# Patient Record
Sex: FEMALE | Race: WHITE
[De-identification: names, ages, dates, MRNs, and addresses within clinical notes are randomized per-mention and may not be internally consistent; named-entity substitution may affect disease eponyms.]

---

## 2018-02-27 ENCOUNTER — HOSPITAL ENCOUNTER (INPATIENT)
Dept: HOSPITAL 74 - JLDR | Age: 33
LOS: 4 days | Discharge: HOME | End: 2018-03-03
Attending: OBSTETRICS & GYNECOLOGY | Admitting: OBSTETRICS & GYNECOLOGY
Payer: COMMERCIAL

## 2018-02-27 VITALS — BODY MASS INDEX: 29.5 KG/M2

## 2018-02-27 DIAGNOSIS — Z30.2: ICD-10-CM

## 2018-02-27 DIAGNOSIS — O30.043: Primary | ICD-10-CM

## 2018-02-27 DIAGNOSIS — Z3A.37: ICD-10-CM

## 2018-02-27 LAB
ANION GAP SERPL CALC-SCNC: 10 MMOL/L (ref 8–16)
APTT BLD: 27.6 SECONDS (ref 26.9–34.4)
APTT BLD: 28.2 SECONDS (ref 26.9–34.4)
ARTERIAL BLOOD GAS PCO2: 39.2 MMHG (ref 35–45)
ARTERIAL BLOOD GAS PCO2: 42.7 MMHG (ref 35–45)
BASE EXCESS BLDA CALC-SCNC: -0.6 MEQ/L (ref -2–2)
BASE EXCESS BLDA CALC-SCNC: -1.1 MEQ/L (ref -2–2)
BASOPHILS # BLD: 0.3 % (ref 0–2)
BASOPHILS # BLD: 0.4 % (ref 0–2)
BUN SERPL-MCNC: 8 MG/DL (ref 7–18)
CALCIUM SERPL-MCNC: 8.7 MG/DL (ref 8.5–10.1)
CHLORIDE SERPL-SCNC: 106 MMOL/L (ref 98–107)
CO2 SERPL-SCNC: 23 MMOL/L (ref 21–32)
CREAT SERPL-MCNC: 0.6 MG/DL (ref 0.55–1.02)
DEPRECATED RDW RBC AUTO: 15.8 % (ref 11.6–15.6)
DEPRECATED RDW RBC AUTO: 16 % (ref 11.6–15.6)
EOSINOPHIL # BLD: 0.5 % (ref 0–4.5)
EOSINOPHIL # BLD: 1 % (ref 0–4.5)
GLUCOSE SERPL-MCNC: 66 MG/DL (ref 74–106)
HCT VFR BLD CALC: 29.2 % (ref 32.4–45.2)
HCT VFR BLD CALC: 33.7 % (ref 32.4–45.2)
HGB BLD-MCNC: 10.9 GM/DL (ref 10.7–15.3)
HGB BLD-MCNC: 9.4 GM/DL (ref 10.7–15.3)
INR BLD: 0.9 (ref 0.82–1.09)
INR BLD: 0.94 (ref 0.82–1.09)
LYMPHOCYTES # BLD: 19.5 % (ref 8–40)
LYMPHOCYTES # BLD: 28.3 % (ref 8–40)
MCH RBC QN AUTO: 25.1 PG (ref 25.7–33.7)
MCH RBC QN AUTO: 25.4 PG (ref 25.7–33.7)
MCHC RBC AUTO-ENTMCNC: 32.3 G/DL (ref 32–36)
MCHC RBC AUTO-ENTMCNC: 32.3 G/DL (ref 32–36)
MCV RBC: 77.7 FL (ref 80–96)
MCV RBC: 78.7 FL (ref 80–96)
MONOCYTES # BLD AUTO: 7.5 % (ref 3.8–10.2)
MONOCYTES # BLD AUTO: 8.6 % (ref 3.8–10.2)
NEUTROPHILS # BLD: 61.7 % (ref 42.8–82.8)
NEUTROPHILS # BLD: 72.2 % (ref 42.8–82.8)
PH BLDV: 7.33 [PH] (ref 7.32–7.42)
PH BLDV: 7.37 [PH] (ref 7.32–7.42)
PLATELET # BLD AUTO: 172 K/MM3 (ref 134–434)
PLATELET # BLD AUTO: 201 K/MM3 (ref 134–434)
PMV BLD: 10.1 FL (ref 7.5–11.1)
PMV BLD: 10.8 FL (ref 7.5–11.1)
PO2 BLDA: 18.4 MMHG (ref 80–100)
PO2 BLDA: 27.7 MMHG (ref 80–100)
POTASSIUM SERPLBLD-SCNC: 4.3 MMOL/L (ref 3.5–5.1)
PT PNL PPP: 10.2 SEC (ref 9.98–11.88)
PT PNL PPP: 10.6 SEC (ref 9.98–11.88)
RBC # BLD AUTO: 3.76 M/MM3 (ref 3.6–5.2)
RBC # BLD AUTO: 4.28 M/MM3 (ref 3.6–5.2)
SAO2 % BLDA: 34.9 % (ref 90–98.9)
SAO2 % BLDA: 63.3 % (ref 90–98.9)
SODIUM SERPL-SCNC: 139 MMOL/L (ref 136–145)
VENOUS PC02: 43.9 MMHG (ref 38–52)
VENOUS PC02: 49.2 MMHG (ref 38–52)
VENOUS PO2: 16.5 MMHG (ref 28–48)
VENOUS PO2: 19.9 MMHG (ref 28–48)
WBC # BLD AUTO: 7.9 K/MM3 (ref 4–10)
WBC # BLD AUTO: 8.4 K/MM3 (ref 4–10)

## 2018-02-27 PROCEDURE — 0UL70CZ OCCLUSION OF BILATERAL FALLOPIAN TUBES WITH EXTRALUMINAL DEVICE, OPEN APPROACH: ICD-10-PCS | Performed by: OBSTETRICS & GYNECOLOGY

## 2018-02-27 RX ADMIN — CEFAZOLIN SCH MLS/HR: 1 INJECTION, POWDER, FOR SOLUTION INTRAVENOUS at 12:18

## 2018-02-27 RX ADMIN — IBUPROFEN PRN MG: 800 INJECTION INTRAVENOUS at 15:12

## 2018-02-27 RX ADMIN — CEFAZOLIN SCH MLS/HR: 1 INJECTION, POWDER, FOR SOLUTION INTRAVENOUS at 20:26

## 2018-02-27 RX ADMIN — SODIUM CHLORIDE, SODIUM LACTATE, POTASSIUM CHLORIDE, CALCIUM CHLORIDE AND DEXTROSE MONOHYDRATE SCH MLS/HR: 5; 600; 310; 30; 20 INJECTION, SOLUTION INTRAVENOUS at 23:46

## 2018-02-27 RX ADMIN — SODIUM CHLORIDE, SODIUM LACTATE, POTASSIUM CHLORIDE, CALCIUM CHLORIDE AND DEXTROSE MONOHYDRATE SCH: 5; 600; 310; 30; 20 INJECTION, SOLUTION INTRAVENOUS at 19:52

## 2018-02-27 RX ADMIN — IBUPROFEN PRN MG: 800 INJECTION INTRAVENOUS at 20:26

## 2018-02-27 RX ADMIN — IBUPROFEN PRN MG: 800 INJECTION INTRAVENOUS at 09:30

## 2018-02-27 RX ADMIN — ACETAMINOPHEN PRN MG: 325 TABLET ORAL at 23:08

## 2018-02-27 NOTE — HP
Past Medical History





- Primary Care Physician


PCP:: Sachin Beltran





- Admission


Chief Complaint: 33yo P1 @ 37.1 wks with clear LOF @ 3:20am, + ctx, no VB, +Fm


History of Present Illness: 





1. Twin gestation - breech/vtx on 18  Twin A - 2613gm, Twin B - 2250gm


2. GCT-147/GTT nl


History Source: Patient


Limitations to Obtaining History: No Limitations





- Past Medical History


...: 2


...Para: 1


...EDC by Dates: 18


Additional OB History:  x 1


Heme/Onc: Yes: Anemia





- Past Surgical History


Past Surgical History: Yes: None


Hx Myomectomy: No


Hx Transabdominal Cerclage: No





- Smoking History


Smoking history: Never smoked


Have you smoked in the past 12 months: No





- Alcohol/Substance Use


Hx Alcohol Use: No





- Social History


History of Recent Travel: No





Home Medications





- Allergies


Allergies/Adverse Reactions: 


 Allergies











Allergy/AdvReac Type Severity Reaction Status Date / Time


 


No Known Allergies Allergy   Verified 18 10:59














- Home Medications


Home Medications: 


Ambulatory Orders





Prenatal Vitamins (Sjr) - 1 tab PO DAILY #1 tablet 07/09/15 











Review of Systems





- Review of Systems


Constitutional: reports: No Symptoms


Eyes: reports: No Symptoms


HENT: reports: No Symptoms


Neck: reports: No Symptoms


Cardiovascular: reports: No Symptoms


Respiratory: reports: No Symptoms


Gastrointestinal: reports: No Symptoms


Genitourinary: reports: Other (Labor pains, [place] of fluid)


Breasts: reports: No Symptoms Reported


Musculoskeletal: reports: No Symptoms


Integumentary: reports: No Symptoms


Neurological: reports: No Symptoms


Endocrine: reports: No Symptoms


Hematology/Lymphatic: reports: No Symptoms


Psychiatric: reports: No Symptoms





Physical Exam - Maternity


Constitutional: Yes: Well Nourished


Eyes: Yes: WNL


HENT: Yes: WNL, Atraumatic, Normocephalic


Neck: Yes: WNL


Cardiovascular: Yes: WNL, Regular Rate and Rhythm


Lungs: Clear to auscultation


Breast(s): Yes: WNL





- Abdominal Exam/OB


Fundal Height: 40


Number of Fetuses: Multiple (Breech/Vertex)


Fetal Presentation: Twins (Breech/Vertex)


Contractions: Yes


Regularity: Regular


Intensity: Mild/Mod


Fetal Monitor Mode: External


Fetal Heart Rate (range): 140 both


Fetal Heart Rate Location: RUQ, RLQ


Category: I


Accelerations: Uniform


Decelerations: None





- Vaginal Exam/OB


Vaginal Bleediing: No


Speculum Exam: No (grossly ruptured)


Dilatation (cm): 5-6cm


Effacement (%): 90%


Amniotic Membrane Status: Ruptured


Nitrazine Test: Positive


Amniotic Fluid: Yes: Clear


Fetal Presentation: Transverse/Shoulder (Transverse/Vertex)


Fetal Station: -3





- Physical Exam


Musculoskeletal: Yes: WNL


Edema: LUE: 1+, RUE: 1+


Integumentary: Yes: WNL


Deep Tendon Reflex Grade: Normal +2


...Motor Strength: WNL


Psychiatric: Yes: WNL





Assessment/Plan





33yo P1 @ 37wks with Breech/Vtx twins in active labor


Admit to L&D


Fetal status Category 1 - both babies 


NPO,


Routine admit labs


Anesthesia aware


Proceed with c/section and Tubal ligation


Consent signed and witnessed


All risks/benefits/alternatives explained

## 2018-02-27 NOTE — OP
DATE OF OPERATION:  2018

 

PREOPERATIVE DIAGNOSIS:  A 32-year-old para 1 at 37 weeks and 1 day with breech

vertex twins in active labor, multiparity.  

 

POSTOPERATIVE DIAGNOSIS:  A 32-year-old para 1 at 37 weeks and 1 day with breech

vertex twins in active labor, multiparity.  

 

OPERATION:  Primary  section and bilateral tubal ligation.

 

SURGEON:  Vida Malin MD

 

ASSISTANT:  _____

 

ANESTHESIOLOGIST:  Jm Orellana MD

 

ANESTHESIA:  Spinal.

 

DESCRIPTION OF THE OPERATIVE PROCEDURE:   After assuring informed consent, patient

was brought to the operating room, where she was placed in dorsal supine position

with left lateral tilt.  Abdomen was prepped and draped in sterile fashion.  

 

A Pfannenstiel skin incision was made with the scalpel, carried down to the level of

fascia with the Bovie cautery.  Fascia was incised with Bovie cautery and extended

bilaterally with Bovie cautery, dissected off the rectus muscle by grasping with

Kochers and also dissecting inferiorly and superiorly with cautery.  Muscle was split

in the midline and dissected downward with the Bovie cautery. The peritoneum was

identified and entered bluntly.  Vesicouterine peritoneum was retracted with lower

edge of the [place].  Vesicouterine peritoneum was identified, tented, and dissected

bilaterally with Metzenbaum scissors, retracted with lower edge of the [place]. 

Uterine incision was made with the scalpel and extended bilaterally with bandage

scissors.  

 

First infant, baby A, was delivered sacrum anterior in octavio breech position,

suctioned upon delivery.  Cord clamped and cut.  Infant was handed to pediatricians. 

Second infant, twin B, was delivered vertex OMEGA presentation, suctioned.  Cord

clamped and cut.  Infant was handed to pediatricians.  Dr. Durand in attendance.  Twin

A was 5 pounds 8 ounces.  Twin B was 5 pounds 9 ounces.  Apgars were 9 and 9 for each

twin.  Placenta was expressed without difficulty, sent to Pathology.  The uterus was

cleared fo clots and debris and sutured with 0 Biosyn in running locking suture.  The

second imbricating layer covering the first layer was also done using 0 Biosyn

suture.  Vesicouterine peritoneum was reapproximated.  

 

Abdomen was cleared of clots and debris, and subsequently each fallopian tube was

grasped with the Jean, identified the fimbriated end.  First, each fimbriated end

was truncated and tied with 0 Vicryl and subsequently the mid ampullary region of

each tube was also tied, and mid portion in between 2 ties was excised.  Excellent

hemostasis was achieved.  Two portions of each fallopian tube was sent to Pathology. 

Normal ovaries were identified.  

 

The peritoneum was sutured with 0 Biosyn.  Muscle was reapproximated at the midline. 

The fascia was closed with 0 Vicryl in running locking fashion, 1 running suture. 

Subcutaneous tissue was closed with 2-0 chromic, and skin was closed with 4-0 Biosyn.

 

 

ESTIMATED BLOOD LOSS:  700 mL.

 

URINE OUTPUT:  600 mL.

 

FLUIDS:  Received 1700 mL of fluids.  

 

Incision was dressed.  Uterus was cleared of clots and debris.  

 

COUNTS:  All instrument and sponge counts were correct x2.  

 

DISPOSITION:  Patient was brought to the recovery room in stable condition.  

 

 

DINO [id number]

DD: 2018 09:44

DT: 2018 11:42

Job #:  [id number]

## 2018-02-27 NOTE — PN
Delivery





- Delivery


Vaginal Delivery: No Problems


 Section: Primary


Type of Anesthesia: Local


EBL (cc): 700





Delivery, Single Birth





- Stages of Labor


Date of Delivery: 18


Date Placenta Delivered: 18


Time Placenta Delivered: 06:17


Placenta: Yes: Expressed





-  Feeding Plan


Initial Plan: Elected not to breastfeed exclusively throughout hospitalization





Delivery, Multiple Births





- Condition of Multiple Births


  **  1 (A)


Pediatrician/Neonatologist Present: Yes


Pediatrician: Stefania Durand


Infant Gender: Male


Birth Weight: 5 lb 8 oz


Position: SP


Placenta: Yes: Expressed





  ** [place] 2 (B)


Pediatrician/Neonatologist Present: Yes


Infant Gender: Male


Birth Weight: 5 lb 9 oz


Position: Left, OA


Placenta: Yes: Expressed





- Apgar


  **  2 (B)


Apgar Score: 9





  ** [place] 1 (A)


Apgar Score: 9





  ** 5 Minutes


Apgar Score: 9





Remarks





- Remarks


Remarks: 





See op note

## 2018-02-27 NOTE — OP
Operative Note





- Note:


Operative Date: 02/27/18


Pre-Operative Diagnosis: 31yo P1 @ 37.1wks Br/Vtx in active labor, multiparity


Operation: Primary c/section, Bilateral tubal ligation


Post-Operative Diagnosis: Same as Pre-op


Surgeon: Vida Malin


Assistant: Sachin Beltran


Anesthesiologist/CRNA: Jm Orellana


Anesthesia: Spinal


Specimens Removed: 1. Bilateral portions of fallopian tubes


Estimated Blood Loss (mls): 700


Drains, Volume Out (mls): 600


Fluid Volume Replaced (mls): 1,700


Operative Report Dictated: Yes

## 2018-02-28 LAB
BASOPHILS # BLD: 0.2 % (ref 0–2)
DEPRECATED RDW RBC AUTO: 16.2 % (ref 11.6–15.6)
EOSINOPHIL # BLD: 1.2 % (ref 0–4.5)
HCT VFR BLD CALC: 28.6 % (ref 32.4–45.2)
HGB BLD-MCNC: 9.3 GM/DL (ref 10.7–15.3)
LYMPHOCYTES # BLD: 17.5 % (ref 8–40)
MCH RBC QN AUTO: 25.3 PG (ref 25.7–33.7)
MCHC RBC AUTO-ENTMCNC: 32.6 G/DL (ref 32–36)
MCV RBC: 77.6 FL (ref 80–96)
MONOCYTES # BLD AUTO: 5.9 % (ref 3.8–10.2)
NEUTROPHILS # BLD: 75.2 % (ref 42.8–82.8)
PLATELET # BLD AUTO: 164 K/MM3 (ref 134–434)
PMV BLD: 9.4 FL (ref 7.5–11.1)
RBC # BLD AUTO: 3.69 M/MM3 (ref 3.6–5.2)
WBC # BLD AUTO: 7.3 K/MM3 (ref 4–10)

## 2018-02-28 RX ADMIN — IBUPROFEN PRN MG: 600 TABLET, FILM COATED ORAL at 09:51

## 2018-02-28 RX ADMIN — SODIUM CHLORIDE, SODIUM LACTATE, POTASSIUM CHLORIDE, CALCIUM CHLORIDE AND DEXTROSE MONOHYDRATE SCH MLS/HR: 5; 600; 310; 30; 20 INJECTION, SOLUTION INTRAVENOUS at 06:20

## 2018-02-28 RX ADMIN — ACETAMINOPHEN PRN MG: 325 TABLET ORAL at 02:13

## 2018-02-28 RX ADMIN — IBUPROFEN PRN MG: 600 TABLET, FILM COATED ORAL at 02:14

## 2018-02-28 RX ADMIN — SIMETHICONE CHEW TAB 80 MG PRN MG: 80 TABLET ORAL at 19:45

## 2018-02-28 RX ADMIN — SIMETHICONE CHEW TAB 80 MG PRN MG: 80 TABLET ORAL at 15:46

## 2018-02-28 RX ADMIN — SIMETHICONE CHEW TAB 80 MG PRN MG: 80 TABLET ORAL at 05:50

## 2018-02-28 RX ADMIN — ACETAMINOPHEN PRN MG: 325 TABLET ORAL at 15:46

## 2018-02-28 RX ADMIN — ACETAMINOPHEN PRN MG: 325 TABLET ORAL at 05:51

## 2018-02-28 RX ADMIN — IBUPROFEN PRN MG: 600 TABLET, FILM COATED ORAL at 21:24

## 2018-02-28 RX ADMIN — ACETAMINOPHEN PRN MG: 325 TABLET ORAL at 09:48

## 2018-02-28 RX ADMIN — ACETAMINOPHEN PRN MG: 325 TABLET ORAL at 20:48

## 2018-02-28 RX ADMIN — SODIUM CHLORIDE, SODIUM LACTATE, POTASSIUM CHLORIDE, CALCIUM CHLORIDE AND DEXTROSE MONOHYDRATE SCH: 5; 600; 310; 30; 20 INJECTION, SOLUTION INTRAVENOUS at 16:52

## 2018-02-28 NOTE — PN
Post Partum Progress Note


Post Partum Day: 1


Type of Delivery: Primary C/S


Vital Signs: 


 Vital Signs











Temperature  98.6 F   02/28/18 08:20


 


Pulse Rate  70   02/28/18 08:20


 


Respiratory Rate  20   02/28/18 08:20


 


Blood Pressure  125/69   02/28/18 08:20


 


O2 Sat by Pulse Oximetry (%)  100   02/27/18 08:15











Breast Exam: Yes: Soft


Uterus: Yes: Fundus Firm, Non-tender


Abdomen/GI: Yes: Abdomen soft


Lochia: Yes: Rubra


Lochia, amount: Small


Extremities: Yes: Calves non-tender


Perineum: Yes: Intact


Activity: Other (in bed now)





- Labs


Labs: 


 CBC











WBC  7.3 K/mm3 (4.0-10.0)   02/28/18  06:50    


 


RBC  3.69 M/mm3 (3.60-5.2)   02/28/18  06:50    


 


Hgb  9.3 GM/dL (10.7-15.3)  L  02/28/18  06:50    


 


Hct  28.6 % (32.4-45.2)  L  02/28/18  06:50    


 


MCV  77.6 fl (80-96)  L  02/28/18  06:50    


 


MCH  25.3 pg (25.7-33.7)  L  02/28/18  06:50    


 


MCHC  32.6 g/dl (32.0-36.0)   02/28/18  06:50    


 


RDW  16.2 % (11.6-15.6)  H  02/28/18  06:50    


 


Plt Count  164 K/MM3 (134-434)   02/28/18  06:50    


 


MPV  9.4 fl (7.5-11.1)   02/28/18  06:50    


 


Neutrophils %  75.2 % (42.8-82.8)   02/28/18  06:50    


 


Lymphocytes %  17.5 % (8-40)   02/28/18  06:50    


 


Monocytes %  5.9 % (3.8-10.2)   02/28/18  06:50    


 


Eosinophils %  1.2 % (0-4.5)  D 02/28/18  06:50    


 


Basophils %  0.2 % (0-2.0)   02/28/18  06:50    














Assessment/Plan





33yo P2-3 s/p primary LT C/S, doing well stable, afebrile.


Incision is not bleeding and is clean and intact


Postpartum care instructions reviewed.


Continue routine postop care.


Ambulation encouraged.

## 2018-02-28 NOTE — PN
Progress Note, Physician


Chief Complaint: 





s/p c section under spinal anesthesia


History of Present Illness: 





post op day one, duramorph for post op pain control





- Current Medication List


Current Medications: 


Active Medications





Acetaminophen (Tylenol -)  650 mg PO Q4H PRN


   PRN Reason: PAIN LEVEL 4 - 6


   Last Admin: 02/28/18 05:51 Dose:  650 mg


Benzocaine (Americaine 20% Spray -)  1 spray TP PRN PRN


   PRN Reason: Pain - Topical


Benzocaine (Americaine Ointment -)  1 applic MT PRN PRN


   PRN Reason: Pain - Topical


Bisacodyl (Dulcolax Suppository -)  10 mg MT PRN PRN


   PRN Reason: CONSTIPATION


Diphenhydramine HCl (Benadryl Injection -)  25 mg IVPUSH Q4H PRN


   PRN Reason: Pruritis


Diphenhydramine HCl (Benadryl -)  25 mg PO Q8H PRN


   PRN Reason: FOR ITCHING


Dextrose/Lactated Ringer's (D5-Lr -)  1,000 mls @ 125 mls/hr IV ASDIR MARA


   Last Admin: 02/28/18 06:20 Dose:  125 mls/hr


Ibuprofen (Motrin -)  600 mg PO Q4H PRN


   PRN Reason: PAIN LEVEL 1 - 3


   Last Admin: 02/28/18 02:14 Dose:  600 mg


Methylergonovine Maleate (Methergine Injection -)  0.2 mg IM Q4H PRN


   PRN Reason: EXCESSIVE BLEEDING


Ondansetron HCl (Zofran Injection)  4 mg IVPUSH Q4H PRN


   PRN Reason: NAUSEA


   Last Admin: 02/27/18 08:10 Dose:  4 mg


Oxycodone HCl (Roxicodone -)  5 mg PO Q4H PRN


   PRN Reason: PAIN LEVEL 4 - 6


   Stop: 03/02/18 23:59


Oxycodone HCl (Roxicodone -)  10 mg PO Q4H PRN


   PRN Reason: PAIN LEVEL 7 - 10


   Stop: 03/02/18 23:59


   Last Admin: 02/28/18 05:50 Dose:  10 mg


Senna/Docusate Sodium (Pericolace -)  2 tablet PO HS PRN


   PRN Reason: CONSTIPATION


Simethicone (Mylicon -)  80 mg PO Q4H PRN


   PRN Reason: GAS


   Last Admin: 02/28/18 05:50 Dose:  80 mg


Witch Hazel/Glycerin (Tucks Pads -)  1 pad TP PRN PRN


   PRN Reason: Pain - Topical











- Objective


Vital Signs: 


 Vital Signs











Temperature  98.6 F   02/28/18 08:20


 


Pulse Rate  70   02/28/18 08:20


 


Respiratory Rate  20   02/28/18 08:20


 


Blood Pressure  125/69   02/28/18 08:20


 


O2 Sat by Pulse Oximetry (%)  100   02/27/18 08:15











Constitutional: Yes: Well Nourished


Cardiovascular: Yes: WNL


Respiratory: Yes: WNL


Gastrointestinal: Yes: WNL


Neurological: Yes: WNL


Labs: 


 CBC, BMP





 02/28/18 06:50 





 02/27/18 05:05 





 INR, PTT











INR  0.94  (0.82-1.09)   02/27/18  16:10    














Assessment/Plan





No adverse reaction to anesthetic, no headache, nausea, vomiting, or residual 

weakness.  Dept of anesthesiology will sign off care at this time.

## 2018-03-01 RX ADMIN — IBUPROFEN PRN MG: 600 TABLET, FILM COATED ORAL at 01:12

## 2018-03-01 RX ADMIN — IBUPROFEN PRN MG: 600 TABLET, FILM COATED ORAL at 07:08

## 2018-03-01 RX ADMIN — SODIUM CHLORIDE, SODIUM LACTATE, POTASSIUM CHLORIDE, CALCIUM CHLORIDE AND DEXTROSE MONOHYDRATE SCH: 5; 600; 310; 30; 20 INJECTION, SOLUTION INTRAVENOUS at 08:35

## 2018-03-01 RX ADMIN — ACETAMINOPHEN PRN MG: 325 TABLET ORAL at 21:58

## 2018-03-01 RX ADMIN — SIMETHICONE CHEW TAB 80 MG PRN MG: 80 TABLET ORAL at 21:57

## 2018-03-01 RX ADMIN — IBUPROFEN PRN MG: 600 TABLET, FILM COATED ORAL at 12:49

## 2018-03-01 RX ADMIN — ACETAMINOPHEN PRN MG: 325 TABLET ORAL at 01:11

## 2018-03-01 RX ADMIN — IBUPROFEN PRN MG: 600 TABLET, FILM COATED ORAL at 21:58

## 2018-03-01 NOTE — PN
Post Partum Progress Note





- Subjective


Subjective: 





Patient without acute complaints. 


Reports tolerating oral intake without nausea or vomiting. 


Ambulating without dizziness. 


Denies fevers or chills.


Pain well controlled with oral pain medication.


Breastfeeding without difficulty.


Passing flatus.


Post Partum Day: 2


Type of Delivery: Repeat C/S


Vital Signs: 


 Vital Signs











Temperature  98.5 F   02/28/18 20:48


 


Pulse Rate  86   02/28/18 20:48


 


Respiratory Rate  18   02/28/18 20:48


 


Blood Pressure  115/68   02/28/18 20:48


 


O2 Sat by Pulse Oximetry (%)  98   02/28/18 10:48











Breast Exam: Yes: Engorged


Uterus: Yes: Fundus Firm, Fundus below umbilicus


Incision: Yes: Dressing dry and intact


Abdomen/GI: Yes: Abdomen soft, Tender (mild incisional), Passing flatus, 

Tolerating PO


Lochia: Yes: Serosa


Lochia, amount: Small


Extremities: Yes: Calves non-tender, Edema (+1)


Activity: Ambulating





- Labs


Labs: 


 CBC











WBC  7.3 K/mm3 (4.0-10.0)   02/28/18  06:50    


 


RBC  3.69 M/mm3 (3.60-5.2)   02/28/18  06:50    


 


Hgb  9.3 GM/dL (10.7-15.3)  L  02/28/18  06:50    


 


Hct  28.6 % (32.4-45.2)  L  02/28/18  06:50    


 


MCV  77.6 fl (80-96)  L  02/28/18  06:50    


 


MCH  25.3 pg (25.7-33.7)  L  02/28/18  06:50    


 


MCHC  32.6 g/dl (32.0-36.0)   02/28/18  06:50    


 


RDW  16.2 % (11.6-15.6)  H  02/28/18  06:50    


 


Plt Count  164 K/MM3 (134-434)   02/28/18  06:50    


 


MPV  9.4 fl (7.5-11.1)   02/28/18  06:50    


 


Neutrophils %  75.2 % (42.8-82.8)   02/28/18  06:50    


 


Lymphocytes %  17.5 % (8-40)   02/28/18  06:50    


 


Monocytes %  5.9 % (3.8-10.2)   02/28/18  06:50    


 


Eosinophils %  1.2 % (0-4.5)  D 02/28/18  06:50    


 


Basophils %  0.2 % (0-2.0)   02/28/18  06:50    














Assessment/Plan





31 yo POD # 2 s/p primary CD, afebrile, vital signs stable, doing well 


1. Continue routine postoperative  care. 


2. Encourage ambulation and incentive spirometer use


3. Continue oral pain medication


4. Anticipate discharge home postoperative day  #3 or #4

## 2018-03-02 LAB
BASOPHILS # BLD: 0.2 % (ref 0–2)
DEPRECATED RDW RBC AUTO: 16.8 % (ref 11.6–15.6)
EOSINOPHIL # BLD: 2.5 % (ref 0–4.5)
HCT VFR BLD CALC: 30.3 % (ref 32.4–45.2)
HGB BLD-MCNC: 10 GM/DL (ref 10.7–15.3)
LYMPHOCYTES # BLD: 22 % (ref 8–40)
MCH RBC QN AUTO: 25.8 PG (ref 25.7–33.7)
MCHC RBC AUTO-ENTMCNC: 33 G/DL (ref 32–36)
MCV RBC: 78.1 FL (ref 80–96)
MONOCYTES # BLD AUTO: 5.1 % (ref 3.8–10.2)
NEUTROPHILS # BLD: 70.2 % (ref 42.8–82.8)
PLATELET # BLD AUTO: 227 K/MM3 (ref 134–434)
PMV BLD: 8.8 FL (ref 7.5–11.1)
RBC # BLD AUTO: 3.88 M/MM3 (ref 3.6–5.2)
WBC # BLD AUTO: 6.9 K/MM3 (ref 4–10)

## 2018-03-02 RX ADMIN — IBUPROFEN PRN MG: 600 TABLET, FILM COATED ORAL at 14:21

## 2018-03-02 RX ADMIN — SIMETHICONE CHEW TAB 80 MG PRN MG: 80 TABLET ORAL at 06:19

## 2018-03-02 RX ADMIN — ACETAMINOPHEN PRN MG: 325 TABLET ORAL at 14:20

## 2018-03-02 RX ADMIN — IBUPROFEN PRN MG: 600 TABLET, FILM COATED ORAL at 06:19

## 2018-03-02 RX ADMIN — ACETAMINOPHEN PRN MG: 325 TABLET ORAL at 06:20

## 2018-03-02 RX ADMIN — SIMETHICONE CHEW TAB 80 MG PRN MG: 80 TABLET ORAL at 14:21

## 2018-03-02 NOTE — PN
Progress Note (short form)





- Note


Progress Note: 





pod 3 . s/p c/s doing well, no c/o 


 CBC, BMP





 03/02/18 07:20 





 02/27/18 05:05 





 Last Vital Signs











Temp Pulse Resp BP Pulse Ox


 


 98.3 F   77   20   133/76   98 


 


 03/02/18 10:00  03/02/18 10:00  03/02/18 10:00  03/02/18 10:00  02/28/18 10:48








abdomen soft, no distension, no cva 


incision dry, clean , healing well


no calf tenderness 


plan ambulate . plan for d/c home in am 


pain management

## 2018-03-02 NOTE — PATH
Surgical Pathology Report



Patient Name:  ADELE KIM

Accession #:  [id number]

[facility]. Rec. #:  [id number]                                                        

   /Age/Gender:  1985 (Age: 32) / F

Account:  [id number]                                                          

             Location: [facility] OBS/GYN

Taken:  2018

Received:  2018

Reported:  3/2/2018

Physicians:  Vida Malin M.D.

  



Specimen(s) Received

A: PLACENTA 

B: RIGHT FALLOPIAN TUBE 

C: LEFT FALLOPIAN TUBE 

D: RIGHT FALLOPIAN TUBE 

E: LEFT FALLOPIAN TUBE 





Clinical History

32 year-old female , 37.1 weeks by sonogram, twin gestation







Final Diagnosis

A. TWIN PLACENTA,  SECTION:

DICHORIONIC DIAMNIOTIC SEPARATE DISCS TWIN THIRD TRIMESTER PLACENTAS (PLACENTA

A, 398 G, PLACENTA B, 356 G) WITH TRIVASCULAR UMBILICAL CORDS AND UNREMARKABLE

PLACENTAL MEMBRANES.



B. FALLOPIAN TUBE, RIGHT, EXCISION:

FULL LUMINAL PORTION OF UNREMARKABLE FALLOPIAN TUBE.



C.  FALLOPIAN TUBE, LEFT, PORTION OF, EXCISION:

FULL LUMINAL PORTION OF UNREMARKABLE FALLOPIAN TUBE.



D.  FALLOPIAN TUBE, RIGHT, EXCISION:

UNREMARKABLE FALLOPIAN TUBE INCLUDING FULL LUMINAL PORTION.



E.  FALLOPIAN TUBE, LEFT, EXCISION:

UNREMARKABLE FALLOPIAN TUBE INCLUDING FULL LUMINAL PORTION.







***Electronically Signed***

Hannah Drake M.D.





Gross Description

A. Received in formalin labeled "placenta A & B," are 2 separate placentas,

joined by dividing membranes. The dividing membranes are tan and opaque. There

is no clamp marking the umbilical cord of arbitrarily designated placenta "A"

and one clamp marking the umbilical cord of arbitrarily designated placenta "B."

Placenta "A" is 398 g and measures 17.5 x 14.5 x 2.2 cm. The attached membranes

are tan, translucent with focal opacities insert marginally. The umbilical cord

measures 14 cm in length and averages 1.2 cm in diameter. The cord inserts

eccentrically, 3 cm to the nearest margin. No true knots or strictures are

identified. Cut surface of the umbilical cord reveals 3 vessels. The fetal

surface is grey blue with moderate fibrin deposition and appropriate caliber

vessels. The maternal surface is red-brown with focal defects. Sectioning

reveals red-brown, spongy parenchyma. No lesions are identified. Placenta "B" is

356 g and measures 15.5 x 14.0 x 2.3 cm. The attached membranes are tan,

translucent with focal opacities insert marginally. The umbilical cord measures

12 cm in length and averages 1.0 cm in diameter. The cord inserts eccentrically,

1.5 cm to the nearest margin. No true knots or strictures are identified. Cut

surface of the umbilical cord reveals 3 vessels. The fetal surface is gray blue

with minimal fibrin deposition and appropriate caliber vessels. The maternal

surface is red-brown and intact. Sectioning reveals red-brown, spongy

parenchyma. No lesions are identified. Representative sections are submitted in

7 cassettes as follows: 1-placenta "A" membrane rolls and umbilical cord;

2-3-full thickness sections of placenta "A"; 4-dividing membranes; 5-placenta

"B" membrane rolls and umbilical cord; 6-7-full thickness sections of placenta

"B".



B. Received in formalin labeled "right fallopian tube," is a 0.7 cm in length

portion of fallopian tube. No fimbria are present. The outer surface is tan-gray

and smooth. Sectioning reveals an unremarkable lumen. Representative sections

are submitted in one cassette.



C. Received in formalin labeled "portion of left fallopian tube," is a 0.7 cm in

length portion of fallopian tube. No fimbria are present. The outer surface is

tan-gray and smooth. Sectioning reveals an unremarkable lumen. Representative

sections are submitted in one cassette.



D. Received in formalin labeled "right fallopian tube," is a 2.5 cm in length

fimbriated fallopian tube. The outer surface is tan-gray and smooth. Sectioning

reveals an unremarkable lumen. Representative sections are submitted in 2

cassettes as follows: 1-fimbria; 2-cross-sections of fallopian tube.



E. Received in formalin labeled "left fallopian tube," is a 2 cm in length

fimbriated fallopian tube. The outer surface is tan grey and smooth. Sectioning

reveals an unremarkable lumen. Representative sections are submitted in 2

cassettes as follows: 1-fimbria; 2-cross-sections of fallopian tube.

/3/1/2018



saudi/3/1/2018

## 2018-03-03 VITALS — HEART RATE: 94 BPM | TEMPERATURE: 98.2 F | SYSTOLIC BLOOD PRESSURE: 124 MMHG | DIASTOLIC BLOOD PRESSURE: 84 MMHG

## 2018-03-03 RX ADMIN — ACETAMINOPHEN PRN MG: 325 TABLET ORAL at 03:11

## 2018-03-03 RX ADMIN — SIMETHICONE CHEW TAB 80 MG PRN MG: 80 TABLET ORAL at 10:09

## 2018-03-03 RX ADMIN — IBUPROFEN PRN MG: 600 TABLET, FILM COATED ORAL at 10:07

## 2018-03-03 RX ADMIN — IBUPROFEN PRN MG: 600 TABLET, FILM COATED ORAL at 10:08

## 2018-03-03 RX ADMIN — IBUPROFEN PRN MG: 600 TABLET, FILM COATED ORAL at 03:10

## 2018-03-03 RX ADMIN — SIMETHICONE CHEW TAB 80 MG PRN MG: 80 TABLET ORAL at 03:10

## 2018-03-03 RX ADMIN — ACETAMINOPHEN PRN MG: 325 TABLET ORAL at 10:08

## 2018-03-03 NOTE — DS
Physical Exam-GYN


Vital Signs: 


 Vital Signs











Temperature  98.5 F   18 21:00


 


Pulse Rate  82   18 21:00


 


Respiratory Rate  20   18 21:00


 


Blood Pressure  126/70   18 21:00


 


O2 Sat by Pulse Oximetry (%)  98   18 10:48











Constitutional: Yes: Well Nourished, No Distress, Calm


Eyes: Yes: WNL, Conjunctiva Clear


HENT: Yes: WNL, Atraumatic, Normocephalic


Neck: Yes: WNL, Supple, Trachea Midline


Cardiovascular: Yes: WNL, Regular Rate and Rhythm


Respiratory: Yes: WNL, Regular, CTA Bilaterally


Gastrointestinal: Yes: WNL, Normal Bowel Sounds, Soft


Renal/: Yes: WNL


External Genitalia: Yes: Normal


Internal Exam Deferred: Yes


....Post Partum: Yes: Uterus firm, Uterus non-tender, Slight lochia rubra


Breast(s): Yes: WNL


Musculoskeletal: Yes: WNL


Extremities: Yes: WNL


Integumentary: Yes: WNL


Wound/Incision: Yes: Clean/Dry, Well Approximated, Sutures Intact


Neurological: Yes: WNL, Alert, Oriented


...Motor Strength: WNL


Psychiatric: Yes: WNL, Alert, Oriented


Labs: 


 CBC, BMP





 18 07:20 





 18 05:05 











Delivery





- Delivery


Vaginal Delivery: No Problems


 Section: Primary


Type of Anesthesia: Local


Episiotomy/Laceration: None


EBL (cc): 700





Delivery, Single Birth





- Stages of Labor


Date of Delivery: 18


Time Placenta Delivered: 06:17


Placenta: Yes: Expressed





-  Feeding Plan


Initial Plan: Elected not to breastfeed exclusively throughout hospitalization





Delivery, Multiple Births





- Stages of Labor


  ** First Stage


Date: 18


Time: 03:30





  ** Delivery Baby "A"


Date: 18


Time: 06:12





  ** Placenta/Membranes "A"


Date: 18


Time: 06:17





  ** Delivery Baby "B"


Date: 18


Time: 06:14





  ** Placenta/Membranes "B"


Date: 18


Time: 06:17





- Condition of Multiple Births


  ** [place] 1 (A)


Pediatrician/Neonatologist Present: Yes


Pediatrician: Stefania Durand


Infant Gender: Male


Birth Weight: 2.495 kg


Position: SP


Total Hours ROM (HRS/MINS): 2 hours 57 minutes





  ** [place] 2 (B)


Pediatrician/Neonatologist Present: Yes


Pediatrician: Stefania Durand


Infant Gender: Male


Birth Weight: 2.523 kg


Position: Left, OA


Total Hours ROM (HRS/MINS): 2 hours 57 minutes





- Apgar


  ** [place] 2 (B) 5 Minutes


Apgar Score: 9





  ** [place] 1 (A)


Apgar Score: 9





  ** 5 Minutes


Apgar Score: 9





  ** [place] 2 (B)


Apgar Score: 9





Discharge Summary


Reason For Visit: LABOR ADMIT





Labor at term, twin gestation


Procedures: Principal: Primary LT C/S


Hospital Course: 





Normal recovery and postpartum course


Condition: Good





- Instructions


Diet, Activity, Other Instructions: 


regular diet , follow up office 1 week


Referrals: 


Sachin Beltran MD [Staff Physician] - 


Disposition: HOME





- Home Medications


Comprehensive Discharge Medication List: 


Ambulatory Orders





RX: Prenatal Vitamins (Sjr) - 1 tab PO DAILY #1 tablet 07/09/15 


Ibuprofen [Motrin -] 600 mg PO QID #28 tablet 18

## 2018-03-03 NOTE — PN
Post Partum Progress Note





- Subjective


Subjective: 





No complaints


Post Partum Day: 4


Type of Delivery: Primary C/S


Vital Signs: 


 Vital Signs











Temperature  98.5 F   03/02/18 21:00


 


Pulse Rate  82   03/02/18 21:00


 


Respiratory Rate  20   03/02/18 21:00


 


Blood Pressure  126/70   03/02/18 21:00


 


O2 Sat by Pulse Oximetry (%)  98   02/28/18 10:48











Uterus: Yes: Fundus Firm, Fundus below umbilicus


Incision: Yes: Sutures intact


Abdomen/GI: Yes: Abdomen soft, Tolerating PO


Lochia: Yes: Rubra


Lochia, amount: Small


Extremities: Yes: Calves non-tender, Edema (trace)


Perineum: Yes: Intact


Activity: Ambulating





- Labs


Labs: 


 CBC











WBC  6.9 K/mm3 (4.0-10.0)   03/02/18  07:20    


 


RBC  3.88 M/mm3 (3.60-5.2)   03/02/18  07:20    


 


Hgb  10.0 GM/dL (10.7-15.3)  L  03/02/18  07:20    


 


Hct  30.3 % (32.4-45.2)  L  03/02/18  07:20    


 


MCV  78.1 fl (80-96)  L  03/02/18  07:20    


 


MCH  25.8 pg (25.7-33.7)   03/02/18  07:20    


 


MCHC  33.0 g/dl (32.0-36.0)   03/02/18  07:20    


 


RDW  16.8 % (11.6-15.6)  H  03/02/18  07:20    


 


Plt Count  227 K/MM3 (134-434)  D 03/02/18  07:20    


 


MPV  8.8 fl (7.5-11.1)   03/02/18  07:20    


 


Neutrophils %  70.2 % (42.8-82.8)   03/02/18  07:20    


 


Lymphocytes %  22.0 % (8-40)  D 03/02/18  07:20    


 


Monocytes %  5.1 % (3.8-10.2)   03/02/18  07:20    


 


Eosinophils %  2.5 % (0-4.5)  D 03/02/18  07:20    


 


Basophils %  0.2 % (0-2.0)   03/02/18  07:20    














Assessment/Plan





33yo P2-3 s/p primary LT C/S, doing well stable, afebrile.


Incision is clean and intact


Postpartum care instructions reviewed.


Continue routine postop care.


Ambulation encouraged.


Discharge instructions reviewed.

## 2023-02-10 ENCOUNTER — APPOINTMENT (RX ONLY)
Dept: URBAN - METROPOLITAN AREA CLINIC 143 | Facility: CLINIC | Age: 38
Setting detail: DERMATOLOGY
End: 2023-02-10

## 2023-02-10 DIAGNOSIS — L82.1 OTHER SEBORRHEIC KERATOSIS: ICD-10-CM | Status: WORSENING

## 2023-02-10 DIAGNOSIS — L57.8 OTHER SKIN CHANGES DUE TO CHRONIC EXPOSURE TO NONIONIZING RADIATION: ICD-10-CM

## 2023-02-10 DIAGNOSIS — L82.0 INFLAMED SEBORRHEIC KERATOSIS: ICD-10-CM

## 2023-02-10 DIAGNOSIS — Z80.8 FAMILY HISTORY OF MALIGNANT NEOPLASM OF OTHER ORGANS OR SYSTEMS: ICD-10-CM

## 2023-02-10 PROCEDURE — ? DIAGNOSIS COMMENT

## 2023-02-10 PROCEDURE — ? SUNSCREEN RECOMMENDATIONS

## 2023-02-10 PROCEDURE — 17110 DESTRUCTION B9 LES UP TO 14: CPT

## 2023-02-10 PROCEDURE — 99203 OFFICE O/P NEW LOW 30 MIN: CPT | Mod: 25

## 2023-02-10 PROCEDURE — ? LIQUID NITROGEN

## 2023-02-10 PROCEDURE — ? COUNSELING

## 2023-02-10 PROCEDURE — ? ADDITIONAL NOTES

## 2023-02-10 PROCEDURE — ? EDUCATIONAL RESOURCES PROVIDED

## 2023-02-10 ASSESSMENT — LOCATION SIMPLE DESCRIPTION DERM
LOCATION SIMPLE: LEFT CHEEK
LOCATION SIMPLE: ABDOMEN
LOCATION SIMPLE: RIGHT ANTERIOR NECK
LOCATION SIMPLE: ABDOMEN
LOCATION SIMPLE: LEFT ANTERIOR NECK

## 2023-02-10 ASSESSMENT — LOCATION DETAILED DESCRIPTION DERM
LOCATION DETAILED: LEFT RIB CAGE
LOCATION DETAILED: LEFT LATERAL MALAR CHEEK
LOCATION DETAILED: LEFT INFERIOR LATERAL NECK
LOCATION DETAILED: RIGHT INFERIOR ANTERIOR NECK
LOCATION DETAILED: RIGHT RIB CAGE
LOCATION DETAILED: LEFT RIB CAGE

## 2023-02-10 ASSESSMENT — LOCATION ZONE DERM
LOCATION ZONE: NECK
LOCATION ZONE: TRUNK
LOCATION ZONE: TRUNK
LOCATION ZONE: FACE

## 2023-02-10 NOTE — PROCEDURE: ADDITIONAL NOTES
Detail Level: Simple
Additional Notes: Recommended Lava pumice soap 2-3 times a week, afterwards apply a body oil or moisturizer
Render Risk Assessment In Note?: no
Additional Notes: Both maternal grandparents

## 2023-02-10 NOTE — PROCEDURE: DIAGNOSIS COMMENT
Render Risk Assessment In Note?: no
Comment: Recommended Eltamd Elements or Eltamd Daily if needing more moisturizing effects.
Detail Level: Generalized

## 2023-02-10 NOTE — PROCEDURE: LIQUID NITROGEN
Include Z78.9 (Other Specified Conditions Influencing Health Status) As An Associated Diagnosis?: Yes
Consent: The patient's consent was obtained including but not limited to risks of crusting, scabbing, blistering, scarring, darker or lighter pigmentary change, recurrence, incomplete removal and infection.
Add 52 Modifier (Optional): no
Medical Necessity Clause: This procedure was medically necessary because the lesions that were treated were:
Spray Paint Text: The liquid nitrogen was applied to the skin utilizing a spray paint frosting technique.
Medical Necessity Information: It is in your best interest to select a reason for this procedure from the list below. All of these items fulfill various CMS LCD requirements except the new and changing color options.
Detail Level: Detailed
Post-Care Instructions: I reviewed with the patient in detail post-care instructions. Patient is to wear sunprotection, and avoid picking at any of the treated lesions. Pt may apply Vaseline to crusted or scabbing areas.